# Patient Record
Sex: MALE | Race: WHITE | URBAN - METROPOLITAN AREA
[De-identification: names, ages, dates, MRNs, and addresses within clinical notes are randomized per-mention and may not be internally consistent; named-entity substitution may affect disease eponyms.]

---

## 2017-05-09 ENCOUNTER — APPOINTMENT (OUTPATIENT)
Dept: OTHER | Facility: HOSPITAL | Age: 62
End: 2017-05-09
Attending: EMERGENCY MEDICINE

## 2017-06-28 ENCOUNTER — APPOINTMENT (OUTPATIENT)
Dept: OTHER | Facility: HOSPITAL | Age: 62
End: 2017-06-28
Attending: EMERGENCY MEDICINE

## 2021-02-02 ENCOUNTER — OFFICE VISIT (OUTPATIENT)
Dept: OTHER | Facility: HOSPITAL | Age: 66
End: 2021-02-02
Attending: ORTHOPAEDIC SURGERY

## 2021-02-02 ENCOUNTER — HOSPITAL ENCOUNTER (OUTPATIENT)
Dept: GENERAL RADIOLOGY | Facility: HOSPITAL | Age: 66
Discharge: HOME | End: 2021-02-02
Attending: ORTHOPAEDIC SURGERY

## 2021-02-02 DIAGNOSIS — T14.90XA INJURY: Primary | ICD-10-CM

## 2021-02-02 PROCEDURE — 73562 X-RAY EXAM OF KNEE 3: CPT | Performed by: ORTHOPAEDIC SURGERY

## 2021-02-05 ENCOUNTER — APPOINTMENT (OUTPATIENT)
Dept: OTHER | Facility: HOSPITAL | Age: 66
End: 2021-02-05
Attending: EMERGENCY MEDICINE

## 2021-02-12 ENCOUNTER — APPOINTMENT (OUTPATIENT)
Dept: OTHER | Facility: HOSPITAL | Age: 66
End: 2021-02-12
Attending: EMERGENCY MEDICINE

## 2021-02-26 ENCOUNTER — APPOINTMENT (OUTPATIENT)
Dept: OTHER | Facility: HOSPITAL | Age: 66
End: 2021-02-26
Attending: EMERGENCY MEDICINE

## 2021-03-19 ENCOUNTER — APPOINTMENT (OUTPATIENT)
Dept: OTHER | Facility: HOSPITAL | Age: 66
End: 2021-03-19
Attending: EMERGENCY MEDICINE

## 2023-05-12 ENCOUNTER — OFFICE VISIT (OUTPATIENT)
Dept: DERMATOLOGY CLINIC | Facility: CLINIC | Age: 68
End: 2023-05-12

## 2023-05-12 DIAGNOSIS — L82.1 SEBORRHEIC KERATOSIS: ICD-10-CM

## 2023-05-12 DIAGNOSIS — D48.5 NEOPLASM OF UNCERTAIN BEHAVIOR OF SKIN: Primary | ICD-10-CM

## 2023-05-12 PROCEDURE — 88305 TISSUE EXAM BY PATHOLOGIST: CPT | Performed by: PHYSICIAN ASSISTANT

## 2023-05-12 NOTE — PROCEDURES
Procedure: Shave biopsy     With the patient is a supine position, the skin was scrubbed with alcohol. Anesthesia was obtained by injecting 2 mL of 1% Xylocaine with Epinephrine. The skin surrounding the lesion on right side of face was placed under tension and the lesion was incised using a #15 scalpel blade. The specimen was sent for histopathological examination. Hemostasis was obtained with cautery. The biopsy site was dressed with bandaid and petroleum jelly. The estimated blood loss was < 1mL. Clinical Dx & Size of lesion(s):    Lesion 1 - AK vs SCC- size: 4 mm     Kregg tolerated the procedure well.   Complications:  none

## 2023-05-15 NOTE — PROGRESS NOTES
Logged in path book and [facility]. LMTCB to discuss pathology and schedule appt for pt.

## 2023-05-25 ENCOUNTER — OFFICE VISIT (OUTPATIENT)
Dept: DERMATOLOGY CLINIC | Facility: CLINIC | Age: 68
End: 2023-05-25

## 2023-05-25 DIAGNOSIS — L82.1 SEBORRHEIC KERATOSIS: ICD-10-CM

## 2023-05-25 DIAGNOSIS — Z12.83 SCREENING EXAM FOR SKIN CANCER: Primary | ICD-10-CM

## 2023-05-25 DIAGNOSIS — L57.0 ACTINIC KERATOSIS: ICD-10-CM

## 2023-05-25 PROCEDURE — 17000 DESTRUCT PREMALG LESION: CPT | Performed by: PHYSICIAN ASSISTANT

## 2023-05-25 PROCEDURE — 99213 OFFICE O/P EST LOW 20 MIN: CPT | Performed by: PHYSICIAN ASSISTANT

## 2023-11-30 ENCOUNTER — OFFICE VISIT (OUTPATIENT)
Dept: OTOLARYNGOLOGY | Facility: CLINIC | Age: 68
End: 2023-11-30

## 2023-11-30 DIAGNOSIS — R05.3 CHRONIC COUGH: Primary | ICD-10-CM

## 2023-11-30 PROCEDURE — 99203 OFFICE O/P NEW LOW 30 MIN: CPT | Performed by: OTOLARYNGOLOGY

## 2023-11-30 PROCEDURE — 31575 DIAGNOSTIC LARYNGOSCOPY: CPT | Performed by: OTOLARYNGOLOGY

## 2023-11-30 RX ORDER — OMEPRAZOLE 40 MG/1
40 CAPSULE, DELAYED RELEASE ORAL DAILY
Qty: 30 CAPSULE | Refills: 2 | Status: SHIPPED | OUTPATIENT
Start: 2023-11-30

## 2023-11-30 RX ORDER — MONTELUKAST SODIUM 10 MG/1
10 TABLET ORAL NIGHTLY
Qty: 30 TABLET | Refills: 3 | Status: SHIPPED | OUTPATIENT
Start: 2023-11-30

## 2023-11-30 RX ORDER — AZELASTINE 1 MG/ML
2 SPRAY, METERED NASAL 2 TIMES DAILY
Qty: 30 ML | Refills: 3 | Status: SHIPPED | OUTPATIENT
Start: 2023-11-30

## 2023-11-30 NOTE — PROGRESS NOTES
Marah Vicente is a 70year old male. Chief Complaint   Patient presents with    Cough     Chronic cough for multiple months       HISTORY OF PRESENT ILLNESS  He presents with several month history of chronic cough. States he has been seen by multiple doctors and now presents for further evaluation and management. Etiology has been unclear has been on multiple medications antibiotics without any real significant help in his symptoms does admit to reflux states that he usually when it does happen because some sensation of discomfort in his throat. LPR? Silent reflux at night? He does note some nasal congestive issues and postnasal discharge. Currently on no medications for sinuses. No other signs, symptoms or complaints. Social History     Socioeconomic History    Marital status:    Tobacco Use    Smoking status: Never     Passive exposure: Never   Substance and Sexual Activity    Alcohol use: Yes     Alcohol/week: 0.0 standard drinks of alcohol     Comment: social    Drug use: Never   Other Topics Concern    Pt has a pacemaker No    Pt has a defibrillator No    Reaction to local anesthetic No       History reviewed. No pertinent family history. Past Medical History:   Diagnosis Date    Actinic keratosis 05/2023    Face - under right eye on cheek    Other and unspecified hyperlipidemia        History reviewed. No pertinent surgical history. REVIEW OF SYSTEMS    System Neg/Pos Details   Constitutional Negative Fatigue, fever and weight loss. ENMT Negative Drooling. Eyes Negative Blurred vision and vision changes. Respiratory Negative Dyspnea and wheezing. Cardio Negative Chest pain, irregular heartbeat/palpitations and syncope. GI Negative Abdominal pain and diarrhea. Endocrine Negative Cold intolerance and heat intolerance. Neuro Negative Tremors. Psych Negative Anxiety and depression. Integumentary Negative Frequent skin infections, pigment change and rash.    Hema/Lymph Negative Easy bleeding and easy bruising. PHYSICAL EXAM    There were no vitals taken for this visit. Constitutional Normal Overall appearance - Normal.   Psychiatric Normal Orientation - Oriented to time, place, person & situation. Appropriate mood and affect. Neck Exam Normal Inspection - Normal. Palpation - Normal. Parotid gland - Normal. Thyroid gland - Normal.   Eyes Normal Conjunctiva - Right: Normal, Left: Normal. Pupil - Right: Normal, Left: Normal. Fundus - Right: Normal, Left: Normal.   Neurological Normal Memory - Normal. Cranial nerves - Cranial nerves II through XII grossly intact. Head/Face Normal Facial features - Normal. Eyebrows - Normal. Skull - Normal.        Nasopharynx Normal External nose - Normal. Lips/teeth/gums - Normal. Tonsils - Normal. Oropharynx - Normal.   Ears Normal Inspection - Right: Normal, Left: Normal. Canal - Right: Normal, Left: Normal. TM - Right: Normal, Left: Normal.   Skin Normal Inspection - Normal.        Lymph Detail Normal Submental. Submandibular. Anterior cervical. Posterior cervical. Supraclavicular. Nose/Mouth/Throat Normal External nose - Normal. Lips/teeth/gums - Normal. Tonsils - Normal. Oropharynx -postnasal discharge with erythema   Nose/Mouth/Throat Normal Nares - Right: Normal Left: Normal. Septum -Normal  Turbinates - Right: Normal, Left: Normal.  Significant nasal mucosal congestion   Procedures:  Endoscopy/Laryngoscopy  Pre-Procedure Care: Verbal consent was obtained. Procedure/risks were explained. Questions were answered. Correct patient identified. Correct side and site confirmed. A topical spray of ). 25% Neosynephrine was sprayed into the nose. Laryngoscopy:  Flexible Fiberoptic Laryngoscopy: A diagnostic flexible fiberoptic laryngoscopy was performed. The flexible fiberoptic laryngoscope was placed into the nose or mouthand advanced  into the interior of the larynx.  A thorough examination of the interior of the larynx was performed. Findings were as follows. Hypopharynx/Larynx:  Epiglottis is normal.  Arytenoids:  Bilateral: Arytenoids are normal.  Vocal folds-false  Bilateral: Vocal folds (false) are normal.  Vocal folds-true  Bilateral: Vocal folds (true) are normal.  Pyriform sinus:  Bilateral: Pyriform sinuses are normal.  Base of tongue is normal in appearance. There is no airway obstruction. General comments: Edema of the laryngeal structures no lesions masses polyps or nodules present. Current Outpatient Medications:     Omeprazole 40 MG Oral Capsule Delayed Release, Take 1 capsule (40 mg total) by mouth daily. Before a meal, Disp: 30 capsule, Rfl: 2    montelukast 10 MG Oral Tab, Take 1 tablet (10 mg total) by mouth nightly., Disp: 30 tablet, Rfl: 3    loratadine-pseudoephedrine ER 5-120 MG Oral Tablet 12 Hr, Take 1 tablet by mouth every 12 (twelve) hours. , Disp: 60 tablet, Rfl: 3    azelastine 0.1 % Nasal Solution, 2 sprays by Nasal route 2 (two) times daily. , Disp: 30 mL, Rfl: 3    Pravastatin Sodium (PRAVACHOL) 20 MG Oral Tab, Take 1 tablet (20 mg total) by mouth once daily. , Disp: , Rfl: 2  ASSESSMENT AND PLAN    1. Chronic cough  Could be related to chronic postnasal discharge and/or reflux. Start Singulair loratadine D as well as Astelin nasal spray and omeprazole. Return to see me in 1 month for reevaluation. I did reassure them the laryngoscopy revealed no malignancies but did suggest significant irritation and inflammation.  - LARYNGOSCOPY,FLEX FIBER,DIAGNOSTIC        This note was prepared using [employer] voice recognition dictation software. As a result errors may occur. When identified these errors have been corrected. While every attempt is made to correct errors during dictation discrepancies may still exist    Guero Groves MD    11/30/2023    5:43 PM

## 2023-12-26 ENCOUNTER — TELEPHONE (OUTPATIENT)
Dept: OTOLARYNGOLOGY | Facility: CLINIC | Age: 68
End: 2023-12-26

## 2023-12-26 NOTE — TELEPHONE ENCOUNTER
Current Outpatient Medications:     montelukast 10 MG Oral Tab, Take 1 tablet (10 mg total) by mouth nightly., Disp: 30 tablet, Rfl: 3

## 2024-01-03 RX ORDER — MONTELUKAST SODIUM 10 MG/1
10 TABLET ORAL NIGHTLY
Qty: 30 TABLET | Refills: 3 | Status: SHIPPED | OUTPATIENT
Start: 2024-01-03

## 2024-01-09 ENCOUNTER — OFFICE VISIT (OUTPATIENT)
Dept: OTOLARYNGOLOGY | Facility: CLINIC | Age: 69
End: 2024-01-09

## 2024-01-09 DIAGNOSIS — R05.3 CHRONIC COUGH: Primary | ICD-10-CM

## 2024-01-09 PROBLEM — E66.9 OBESITY (BMI 30-39.9): Status: ACTIVE | Noted: 2022-07-23

## 2024-01-09 PROBLEM — G47.33 OSA (OBSTRUCTIVE SLEEP APNEA): Status: ACTIVE | Noted: 2024-01-09

## 2024-01-09 PROBLEM — M17.12 PRIMARY OSTEOARTHRITIS OF LEFT KNEE: Status: ACTIVE | Noted: 2022-07-23

## 2024-01-09 PROBLEM — R06.83 SNORING: Status: ACTIVE | Noted: 2024-01-09

## 2024-01-09 PROCEDURE — 99213 OFFICE O/P EST LOW 20 MIN: CPT | Performed by: OTOLARYNGOLOGY

## 2024-01-09 NOTE — PROGRESS NOTES
Jun Rasheed is a 68 year old male.    Chief Complaint   Patient presents with    Follow - Up     Chronic cough reevaluation       HISTORY OF PRESENT ILLNESS  He presents with several month history of chronic cough.  States he has been seen by multiple doctors and now presents for further evaluation and management.  Etiology has been unclear has been on multiple medications antibiotics without any real significant help in his symptoms does admit to reflux states that he usually when it does happen because some sensation of discomfort in his throat.  LPR?  Silent reflux at night?  He does note some nasal congestive issues and postnasal discharge.  Currently on no medications for sinuses.  No other signs, symptoms or complaints.      1/9/24 last visit he presented with a several month history of chronic cough.  Started on Singulair Loratadine-D Astelin nasal spray and omeprazole.  Feels 90% improvement in his symptoms or he occasionally has a tiny cough.  Nothing like before pulse to use medications.  Does not feel that he is really having much reflux and does not feel that omeprazole is really necessary.  No other signs, symptoms or complaints      Social History     Socioeconomic History    Marital status:    Tobacco Use    Smoking status: Never     Passive exposure: Never   Substance and Sexual Activity    Alcohol use: Yes     Alcohol/week: 0.0 standard drinks of alcohol     Comment: social    Drug use: Never   Other Topics Concern    Pt has a pacemaker No    Pt has a defibrillator No    Reaction to local anesthetic No       History reviewed. No pertinent family history.    Past Medical History:   Diagnosis Date    Actinic keratosis 05/2023    Face - under right eye on cheek    Other and unspecified hyperlipidemia        History reviewed. No pertinent surgical history.      REVIEW OF SYSTEMS    System Neg/Pos Details   Constitutional Negative Fatigue, fever and weight loss.   ENMT Negative Drooling.   Eyes  Negative Blurred vision and vision changes.   Respiratory Negative Dyspnea and wheezing.   Cardio Negative Chest pain, irregular heartbeat/palpitations and syncope.   GI Negative Abdominal pain and diarrhea.   Endocrine Negative Cold intolerance and heat intolerance.   Neuro Negative Tremors.   Psych Negative Anxiety and depression.   Integumentary Negative Frequent skin infections, pigment change and rash.   Hema/Lymph Negative Easy bleeding and easy bruising.           PHYSICAL EXAM    There were no vitals taken for this visit.       Constitutional Normal Overall appearance - Normal.   Psychiatric Normal Orientation - Oriented to time, place, person & situation. Appropriate mood and affect.   Neck Exam Normal Inspection - Normal. Palpation - Normal. Parotid gland - Normal. Thyroid gland - Normal.   Eyes Normal Conjunctiva - Right: Normal, Left: Normal. Pupil - Right: Normal, Left: Normal. Fundus - Right: Normal, Left: Normal.   Neurological Normal Memory - Normal. Cranial nerves - Cranial nerves II through XII grossly intact.   Head/Face Normal Facial features - Normal. Eyebrows - Normal. Skull - Normal.        Nasopharynx Normal External nose - Normal. Lips/teeth/gums - Normal. Tonsils - Normal. Oropharynx - Normal.   Ears Normal Inspection - Right: Normal, Left: Normal. Canal - Right: Normal, Left: Normal. TM - Right: Normal, Left: Normal.   Skin Normal Inspection - Normal.        Lymph Detail Normal Submental. Submandibular. Anterior cervical. Posterior cervical. Supraclavicular.        Nose/Mouth/Throat Normal External nose - Normal. Lips/teeth/gums - Normal. Tonsils - Normal. Oropharynx - Normal.   Nose/Mouth/Throat Normal Nares - Right: Normal Left: Normal. Septum -Normal  Turbinates - Right: Normal, Left: Normal.       Current Outpatient Medications:     montelukast 10 MG Oral Tab, Take 1 tablet (10 mg total) by mouth nightly., Disp: 30 tablet, Rfl: 3    Omeprazole 40 MG Oral Capsule Delayed Release, Take 1  capsule (40 mg total) by mouth daily. Before a meal, Disp: 30 capsule, Rfl: 2    loratadine-pseudoephedrine ER 5-120 MG Oral Tablet 12 Hr, Take 1 tablet by mouth every 12 (twelve) hours., Disp: 60 tablet, Rfl: 3    azelastine 0.1 % Nasal Solution, 2 sprays by Nasal route 2 (two) times daily., Disp: 30 mL, Rfl: 3    Pravastatin Sodium (PRAVACHOL) 20 MG Oral Tab, Take 1 tablet (20 mg total) by mouth once daily., Disp: , Rfl: 2  ASSESSMENT AND PLAN    1. Chronic cough  90% better on medications.  At this time I did recommend that he simply stop omeprazole as he is not really having any issues with reflux.  Continue Singulair loratadine and Astelin.  Advised him to continue the same for now once he reaches 100% he can consider weaning himself off of the medications little by little on a weekly basis.  We discussed the weaning procedure and he states understanding have asked him to return to see me if things worsen at any time and to use the medications as needed.  In addition I did recommend that he return to see me on a yearly basis if he continues to require the medications        This note was prepared using [employer] voice recognition dictation software. As a result errors may occur. When identified these errors have been corrected. While every attempt is made to correct errors during dictation discrepancies may still exist    Guero Hyatt MD    1/9/2024    3:09 PM

## 2024-03-08 RX ORDER — OMEPRAZOLE 40 MG/1
40 CAPSULE, DELAYED RELEASE ORAL DAILY
Qty: 30 CAPSULE | Refills: 2 | Status: SHIPPED | OUTPATIENT
Start: 2024-03-08

## 2024-09-03 RX ORDER — AZELASTINE HYDROCHLORIDE 137 UG/1
2 SPRAY, METERED NASAL 2 TIMES DAILY
Qty: 30 ML | Refills: 3 | Status: SHIPPED | OUTPATIENT
Start: 2024-09-03